# Patient Record
Sex: FEMALE | Race: BLACK OR AFRICAN AMERICAN | NOT HISPANIC OR LATINO | URBAN - METROPOLITAN AREA
[De-identification: names, ages, dates, MRNs, and addresses within clinical notes are randomized per-mention and may not be internally consistent; named-entity substitution may affect disease eponyms.]

---

## 2022-10-31 ENCOUNTER — INPATIENT (INPATIENT)
Facility: HOSPITAL | Age: 62
LOS: 0 days | Discharge: HOME | End: 2022-11-01
Attending: INTERNAL MEDICINE

## 2022-10-31 VITALS
WEIGHT: 174.39 LBS | HEIGHT: 65 IN | HEART RATE: 60 BPM | SYSTOLIC BLOOD PRESSURE: 128 MMHG | TEMPERATURE: 97 F | DIASTOLIC BLOOD PRESSURE: 80 MMHG | RESPIRATION RATE: 17 BRPM | OXYGEN SATURATION: 98 %

## 2022-10-31 DIAGNOSIS — I48.4 ATYPICAL ATRIAL FLUTTER: ICD-10-CM

## 2022-10-31 PROBLEM — Z00.00 ENCOUNTER FOR PREVENTIVE HEALTH EXAMINATION: Status: ACTIVE | Noted: 2022-10-31

## 2022-10-31 RX ORDER — PANTOPRAZOLE SODIUM 20 MG/1
40 TABLET, DELAYED RELEASE ORAL ONCE
Refills: 0 | Status: COMPLETED | OUTPATIENT
Start: 2022-10-31 | End: 2022-10-31

## 2022-10-31 RX ORDER — FAMOTIDINE 10 MG/ML
20 INJECTION INTRAVENOUS ONCE
Refills: 0 | Status: COMPLETED | OUTPATIENT
Start: 2022-10-31 | End: 2022-10-31

## 2022-10-31 RX ORDER — ATORVASTATIN CALCIUM 80 MG/1
40 TABLET, FILM COATED ORAL AT BEDTIME
Refills: 0 | Status: DISCONTINUED | OUTPATIENT
Start: 2022-10-31 | End: 2022-11-01

## 2022-10-31 RX ORDER — ATORVASTATIN CALCIUM 80 MG/1
1 TABLET, FILM COATED ORAL
Qty: 0 | Refills: 0 | DISCHARGE

## 2022-10-31 RX ORDER — APIXABAN 2.5 MG/1
5 TABLET, FILM COATED ORAL EVERY 12 HOURS
Refills: 0 | Status: DISCONTINUED | OUTPATIENT
Start: 2022-10-31 | End: 2022-11-01

## 2022-10-31 RX ORDER — ACETAMINOPHEN 500 MG
650 TABLET ORAL EVERY 6 HOURS
Refills: 0 | Status: DISCONTINUED | OUTPATIENT
Start: 2022-10-31 | End: 2022-11-01

## 2022-10-31 RX ORDER — PANTOPRAZOLE SODIUM 20 MG/1
40 TABLET, DELAYED RELEASE ORAL
Refills: 0 | Status: DISCONTINUED | OUTPATIENT
Start: 2022-11-01 | End: 2022-11-01

## 2022-10-31 RX ORDER — CEFAZOLIN SODIUM 1 G
2000 VIAL (EA) INJECTION ONCE
Refills: 0 | Status: COMPLETED | OUTPATIENT
Start: 2022-10-31 | End: 2022-10-31

## 2022-10-31 RX ORDER — AMIODARONE HYDROCHLORIDE 400 MG/1
200 TABLET ORAL DAILY
Refills: 0 | Status: DISCONTINUED | OUTPATIENT
Start: 2022-10-31 | End: 2022-10-31

## 2022-10-31 RX ORDER — APIXABAN 2.5 MG/1
1 TABLET, FILM COATED ORAL
Qty: 0 | Refills: 0 | DISCHARGE

## 2022-10-31 RX ADMIN — ATORVASTATIN CALCIUM 40 MILLIGRAM(S): 80 TABLET, FILM COATED ORAL at 21:25

## 2022-10-31 RX ADMIN — FAMOTIDINE 20 MILLIGRAM(S): 10 INJECTION INTRAVENOUS at 21:25

## 2022-10-31 RX ADMIN — PANTOPRAZOLE SODIUM 40 MILLIGRAM(S): 20 TABLET, DELAYED RELEASE ORAL at 17:59

## 2022-10-31 RX ADMIN — Medication 650 MILLIGRAM(S): at 21:25

## 2022-10-31 RX ADMIN — APIXABAN 5 MILLIGRAM(S): 2.5 TABLET, FILM COATED ORAL at 21:25

## 2022-10-31 RX ADMIN — Medication 650 MILLIGRAM(S): at 21:30

## 2022-10-31 NOTE — H&P ADULT - HISTORY OF PRESENT ILLNESS
60yo Female with h/o HLD, PAD/PVD known AFL, s/p DCCV and ablation 5/6/22, bradycardia, s/p DC PPM ([employer]), with PAF/AFL assocated with palpitations and dyspnea. Asymptomatic at this time    JUNIE 5/6/22  EF 50-55%no WMA, dilated LA and AYO,

## 2022-10-31 NOTE — H&P ADULT - ASSESSMENT
62yo Female with h/o HLD, PAD/PVD known AFL, s/p DCCV and ablation 5/6/22, bradycardia, s/p DC PPM ([place]), with recurrent symptomatic PAF/AFL presents for elective ablation    proceed with procedure  abx periop  EKG in AM  bedrest per protocol  anticipated d/c in AM  Eliquis 5mg bid start post op

## 2022-10-31 NOTE — PATIENT PROFILE ADULT - FALL HARM RISK - HARM RISK INTERVENTIONS

## 2022-10-31 NOTE — H&P ADULT - NSICDXPASTMEDICALHX_GEN_ALL_CORE_FT
PAST MEDICAL HISTORY:  Atrial fibrillation and flutter     History of sinus bradycardia     Peripheral vascular disease

## 2022-11-01 ENCOUNTER — TRANSCRIPTION ENCOUNTER (OUTPATIENT)
Age: 62
End: 2022-11-01

## 2022-11-01 VITALS
RESPIRATION RATE: 18 BRPM | TEMPERATURE: 98 F | SYSTOLIC BLOOD PRESSURE: 126 MMHG | HEART RATE: 60 BPM | DIASTOLIC BLOOD PRESSURE: 81 MMHG

## 2022-11-01 PROBLEM — I48.92 UNSPECIFIED ATRIAL FLUTTER: Chronic | Status: ACTIVE | Noted: 2022-10-31

## 2022-11-01 PROBLEM — Z86.79 PERSONAL HISTORY OF OTHER DISEASES OF THE CIRCULATORY SYSTEM: Chronic | Status: ACTIVE | Noted: 2022-10-31

## 2022-11-01 PROBLEM — I73.9 PERIPHERAL VASCULAR DISEASE, UNSPECIFIED: Chronic | Status: ACTIVE | Noted: 2022-10-31

## 2022-11-01 LAB
HCV AB S/CO SERPL IA: 0.04 COI — SIGNIFICANT CHANGE UP
HCV AB SERPL-IMP: SIGNIFICANT CHANGE UP

## 2022-11-01 PROCEDURE — 99239 HOSP IP/OBS DSCHRG MGMT >30: CPT

## 2022-11-01 PROCEDURE — 99232 SBSQ HOSP IP/OBS MODERATE 35: CPT

## 2022-11-01 RX ORDER — PANTOPRAZOLE SODIUM 20 MG/1
1 TABLET, DELAYED RELEASE ORAL
Qty: 30 | Refills: 0
Start: 2022-11-01 | End: 2022-11-30

## 2022-11-01 RX ORDER — AMIODARONE HYDROCHLORIDE 400 MG/1
1 TABLET ORAL
Qty: 0 | Refills: 0 | DISCHARGE

## 2022-11-01 RX ADMIN — PANTOPRAZOLE SODIUM 40 MILLIGRAM(S): 20 TABLET, DELAYED RELEASE ORAL at 05:03

## 2022-11-01 RX ADMIN — APIXABAN 5 MILLIGRAM(S): 2.5 TABLET, FILM COATED ORAL at 10:49

## 2022-11-01 NOTE — DISCHARGE NOTE PROVIDER - ATTENDING DISCHARGE PHYSICAL EXAMINATION:
Physical Exam  T(C): 36.8 (11-01-22 @ 08:21), Max: 37.1 (11-01-22 @ 04:36)  HR: 60 (11-01-22 @ 08:21) (60 - 65)  BP: 126/81 (11-01-22 @ 08:21) (126/81 - 133/74)  RR: 18 (11-01-22 @ 08:21) (17 - 18)  SpO2: 99% (11-01-22 @ 04:36) (98% - 99%)  Gen: NAD  CV: RRR, nl S1 and S2, no m/r/g, no LE edema, no JVD  Pulm: CTAB, no crackles  GI: soft, nontender  MSK: normal ROM  Extremities: warm  Neuro: A+Ox3  Psych: cooperative

## 2022-11-01 NOTE — DISCHARGE NOTE NURSING/CASE MANAGEMENT/SOCIAL WORK - NSDCPEFALRISK_GEN_ALL_CORE
For information on Fall & Injury Prevention, visit: [email or web address] OR  [email or web address] OR  https://www.cdc.gov/steadi/patient.html Inpatient Physical Exam

## 2022-11-01 NOTE — DISCHARGE NOTE PROVIDER - CARE PROVIDER_API CALL
Lori Valle)  Cardiology  [address] [place] [address], Cardiology Department, [place] [address]  Phone: [phone]  Fax: [phone]  Scheduled Appointment: 11/08/2022 10:00 AM

## 2022-11-01 NOTE — DISCHARGE NOTE PROVIDER - NSDCMRMEDTOKEN_GEN_ALL_CORE_FT
amiodarone 200 mg oral tablet: 1 tab(s) orally once a day  Eliquis 2.5 mg oral tablet: 1 tab(s) orally 2 times a day  Lipitor 40 mg oral tablet: 1 tab(s) orally once a day   Eliquis 2.5 mg oral tablet: 1 tab(s) orally 2 times a day  Lipitor 40 mg oral tablet: 1 tab(s) orally once a day  Protonix 40 mg oral delayed release tablet: 1 tab(s) orally once a day (before a meal) MDD:1

## 2022-11-01 NOTE — DISCHARGE NOTE NURSING/CASE MANAGEMENT/SOCIAL WORK - PATIENT PORTAL LINK FT
You can access the FollowMyHealth Patient Portal offered by [facility] by registering at the following website: [email or web address]. By joining [employer]’s FollowMyHealth portal, you will also be able to view your health information using other applications (apps) compatible with our system.

## 2022-11-01 NOTE — DISCHARGE NOTE PROVIDER - NSDCCPTREATMENT_GEN_ALL_CORE_FT
PRINCIPAL PROCEDURE  Procedure: Radiofrequency ablation, arrhythmogenic focus, for atrial fibrillation  Findings and Treatment:   - Continue Eliquis  - Do NOT stop blood thinners unless discussed with doctor  - Cont Protonix 40 mg daily x 30 days  - No heavy lifting > 10 lbs, squatting, or exertional activities 1-2 weeks  - Can take a shower starting today  - No submerging in water for 1 week  - No driving for 3 days

## 2022-11-01 NOTE — DISCHARGE NOTE PROVIDER - NSDCFUSCHEDAPPT_GEN_ALL_CORE_FT
Juan Mendez  [facility] include 234 beds at the [facility]  CARDIOLOGY [address] [facility]  Scheduled Appointment: 12/16/2022

## 2022-11-01 NOTE — PROGRESS NOTE ADULT - SUBJECTIVE AND OBJECTIVE BOX
INTERVAL HPI/OVERNIGHT EVENTS:  Pt is POD 31 AF/AFL abaltion. No acute events over night. Currently AV paced 60 bpm, no tele events noted.   Patient denies fever, chills, dizziness, syncope, chest pain, palpitations, SOB, cough, abd pain, n/v/d/c, dysuria, hematuria or unusual rash.     MEDICATIONS  (STANDING):  apixaban 5 milliGRAM(s) Oral every 12 hours  atorvastatin 40 milliGRAM(s) Oral at bedtime  pantoprazole    Tablet 40 milliGRAM(s) Oral before breakfast    MEDICATIONS  (PRN):  acetaminophen     Tablet .. 650 milliGRAM(s) Oral every 6 hours PRN Moderate Pain (4 - 6)      Allergies  No Known Allergies    REVIEW OF SYSTEMS    [x] A ten-point review of systems was otherwise negative except as noted.  [ ] Due to altered mental status/intubation, subjective information were not able to be obtained from the patient. History was obtained, to the extent possible, from review of the chart and collateral sources of information.      Vital Signs Last 24 Hrs  T(C): 36.8 (01 Nov 2022 08:21), Max: 37.1 (01 Nov 2022 04:36)  T(F): 98.3 (01 Nov 2022 08:21), Max: 98.8 (01 Nov 2022 04:36)  HR: 60 (01 Nov 2022 08:21) (60 - 65)  BP: 126/81 (01 Nov 2022 08:21) (126/81 - 133/74)  BP(mean): 99 (01 Nov 2022 08:21) (98 - 99)  RR: 18 (01 Nov 2022 08:21) (17 - 18)  SpO2: 99% (01 Nov 2022 04:36) (98% - 99%)    Parameters below as of 01 Nov 2022 04:36  Patient On (Oxygen Delivery Method): room air    10-31-22 @ 07:01  -  11-01-22 @ 07:00  --------------------------------------------------------  IN: 0 mL / OUT: 800 mL / NET: -800 mL    11-01-22 @ 07:01  -  11-01-22 @ 10:26  --------------------------------------------------------  IN: 0 mL / OUT: 500 mL / NET: -500 mL      Physical Exam  GENERAL: In no apparent distress, well nourished, and hydrated.  HEART: Paced/Regular rate; No murmurs, rubs, or gallops.  PULMONARY: Clear to auscultation and perfusion.  No rales, wheezing, or rhonchi bilaterally  ABDOMEN: Soft, Nontender, Nondistended; Bowel sounds present  EXTREMITIES: B/L groins soft, non-tender, no hematoma or drainage noted.  2+ Peripheral Pulses, No clubbing, cyanosis, or edema  NEUROLOGICAL: AO x4 ,BREWER, speech clear    LABS:    10-31-22 @ 07:01  -  11-01-22 @ 07:00  --------------------------------------------------------  IN: 0 mL / OUT: 800 mL / NET: -800 mL    11-01-22 @ 07:01 - 11-01-22 @ 10:26  --------------------------------------------------------  IN: 0 mL / OUT: 500 mL / NET: -500 mL      10-31-22 @ 07:01  -  11-01-22 @ 07:00  --------------------------------------------------------  IN: 0 mL / OUT: 800 mL / NET: -800 mL    11-01-22 @ 07:01  -  11-01-22 @ 10:26  --------------------------------------------------------  IN: 0 mL / OUT: 500 mL / NET: -500 mL        RADIOLOGY:

## 2022-11-01 NOTE — PROGRESS NOTE ADULT - ASSESSMENT
The patient has been examined and the H&P has been reviewed:    I concur with the findings and no changes have occurred since H&P was written.    Anesthesia/Surgery risks, benefits and alternative options discussed and understood by patient/family.          Active Hospital Problems    Diagnosis  POA    Screen for colon cancer [Z12.11]  Not Applicable      Resolved Hospital Problems   No resolved problems to display.     
The patient has been examined and the H&P has been reviewed:    I concur with the findings and no changes have occurred since H&P was written.    Anesthesia/Surgery risks, benefits and alternative options discussed and understood by patient/family.          Active Hospital Problems    Diagnosis  POA    Screen for colon cancer [Z12.11]  Not Applicable    Barretts esophagus [K22.70]  Yes      Resolved Hospital Problems   No resolved problems to display.     
EP: Dr. Valle    60 y/o female with h/o a-flutter s/p DCCV and ablation (5/6/22), bradycardia s/p PPM ([facility] [facility]), HLD and paproxysmal A-fib/flutter with associated palpitations and dyspnea presents for an elective ablation.  Now s/p successful atypical atrial fibrillation ablation on 10/31/2022.  No immediate postop complications noted.     Plan:  - EKG  - Amio dc'd  - Cont Eliquis uninterupted for 3 months postop  - Cont protonix for 30 days postop  - Ambulate as tolerated    Discharge Instructions:  - Continue Eliquis  - Do NOT stop blood thinners unless discussed with doctor  - Cont Protonix 40 mg daily x 30 days  - No heavy lifting > 10 lbs, squatting, or exertional activities 1 week  - Can take a shower starting today  - No submerging in water for 1 week  - No driving for 3 days  - FU in office on 11/8 10 AM, [facility] office

## 2022-11-01 NOTE — DISCHARGE NOTE PROVIDER - HOSPITAL COURSE
62 y/o female with h/o a-flutter s/p DCCV and ablation (5/6/22), bradycardia s/p PPM ([place] [facility]), HLD and paproxysmal A-fib/flutter with associated palpitations and dyspnea presents for an elective ablation.  Now s/p successful atypical atrial fibrillation ablation.  Bilateral groins C/D/I with suture removal performed.  AM EKG showing NSR, AV-paced maintained.             60 y/o female with h/o a-flutter s/p DCCV and ablation (5/6/22), bradycardia s/p PPM ([place] [facility]), HLD and paproxysmal A-fib/flutter with associated palpitations and dyspnea presents for an elective ablation.  Now s/p successful atypical atrial fibrillation ablation.  Bilateral groins C/D/I with suture removal performed.  AM EKG showing NSR, AV-paced maintained.  She will be discharged home on elipascale.  Pierre DC'd, + void.  Seen and examined, she is stable for discharge to home today.           60 y/o female with h/o a-flutter s/p DCCV and ablation (5/6/22), bradycardia s/p PPM ([place]), HLD and paproxysmal A-fib/flutter with associated palpitations and dyspnea presents for an elective ablation.  Now s/p successful atrial fibrillation ablation.  Bilateral groins C/D/I with suture removal performed.  AM EKG showing NSR, AV-paced maintained.  She will be discharged home on elipascale.  Pierre DC'd, + void.  Seen and examined, she is stable for discharge to home today.

## 2022-12-16 ENCOUNTER — APPOINTMENT (OUTPATIENT)
Dept: CARDIOLOGY | Facility: CLINIC | Age: 62
End: 2022-12-16